# Patient Record
Sex: FEMALE | Race: WHITE
[De-identification: names, ages, dates, MRNs, and addresses within clinical notes are randomized per-mention and may not be internally consistent; named-entity substitution may affect disease eponyms.]

---

## 2018-06-08 ENCOUNTER — HOSPITAL ENCOUNTER (OUTPATIENT)
Dept: HOSPITAL 46 - DS | Age: 53
Discharge: HOME | End: 2018-06-08
Attending: SURGERY
Payer: COMMERCIAL

## 2018-06-08 VITALS — BODY MASS INDEX: 35.36 KG/M2 | WEIGHT: 220 LBS | HEIGHT: 66 IN

## 2018-06-08 DIAGNOSIS — K42.0: Primary | ICD-10-CM

## 2018-06-08 DIAGNOSIS — Z88.8: ICD-10-CM

## 2018-06-08 DIAGNOSIS — Z79.899: ICD-10-CM

## 2018-06-08 DIAGNOSIS — E89.0: ICD-10-CM

## 2018-06-08 DIAGNOSIS — Z88.5: ICD-10-CM

## 2018-06-08 PROCEDURE — 0WQF0ZZ REPAIR ABDOMINAL WALL, OPEN APPROACH: ICD-10-PCS | Performed by: SURGERY

## 2018-06-08 NOTE — NUR
06/08/18 Alexis8 Justine Perez
0946 PT ARRIVES TO PACU, RESTING BUT AROUSABLE TO VOICE. LR INFUSING
TO RIGHT WRIST. PT DENIES PAIN. ABD SOFT, NON DISTENDED. PLACE ON ALL
MONITORS.
0951 PT PLACED TO ROOM AIR DUE TO NAUSEA, MAINTAINING AT THIS TIME.
0958 PT MEDICATED WITH PHENERGAN FOR INCREASING NASUEA, ALCOHOL SWAB
USED TO DECREASE NAUSEA.
1002 PT REPORTS NAUSEA IMPROVING, DENIES PAIN. BREATHING EVEN AND NON
LABORED, PT COUGHING WITH DEEP BREATHIGN. PT PLACED ON 2L PER NC FOR
SATS DOWN TO 88% WHILE RESTING. SATS UP 96% WITH O2.

## 2018-06-08 NOTE — OR
[facility]
                                    [address], [place]  [address]
_________________________________________________________________________________________
                                                                 Signed   
 
 
DATE OF OPERATION:
06/08/2018
 
SURGEON:
Francois Avery MD
 
PREOPERATIVE DIAGNOSIS:
Incarcerated epigastric hernia.
 
POSTOPERATIVE DIAGNOSIS:
Incarcerated umbilical hernia (4 mm).
 
PROCEDURE:
Primary umbilical herniorrhaphy.
 
ESTIMATED BLOOD LOSS:
None.
 
INDICATIONS:
Yazmin is a 52-year-old female who came to see me for what we thought was going to be an
epigastric hernia.  She had a previous panniculectomy where they removed her redundant
pannus and then pulled the skin down to her waistline to bring it together and of course
they have to transpose the umbilicus further up the skin.  She noticed a lump on the
superior side of that umbilicus for about 3 years.  She said the pain is usually about a
3/10 on a pain scale.  She said it is sometime larger than others, but she did not feel
like she could ever push it back inside.  She came to me as a general surgeon.  In the
office, we could feel this area just on the superior side of her umbilicus.  Yazmin
explained to me that there was no plication of her midline, which makes this a much
easier surgery.  I explained to Yazmin that she could very well have an epigastric
hernia or umbilical hernia or both.  We also discussed the blood supply to her umbilicus
and we decided we would use a transverse incision that would include the superior scar
of her previous umbilicus and hopefully we will preserve the blood flow to that
umbilical skin.  There is some risk that she could lose that skin.  I did give her a
[employer] brochure on hernias and we looked at both umbilical and epigastric hernias.  We
repaired them in the same manner.  Usually, we use mesh, but if the hole is very small,
we will often just use a few stitches.  She understands the expected intraop and postop
course.  There is risk to that surgery including, but not limited to bleeding,
infection, scarring, change in contour of the skin as well as possible ischemic loss of
that umbilical skin.  She had expressed understanding and wished to proceed. 
 
PROCEDURE NOTE:
I met with Yazmin and her grandmother in our preop area.  Yazmin was able to palpate
 
    Electronically Signed By: FRANCOIS AVERY MD  06/08/18 1136
_________________________________________________________________________________________
PATIENT NAME:     YAZMIN WAYNE                   
MEDICAL RECORD #: [id number]            OPERATIVE REPORT              
          ACCT #: [id number]  
DATE OF BIRTH:   [birth year]            REPORT #: [id number]      
PHYSICIAN:        FRANCOIS AVERY MD             
PCP:              NO PRIMARY CARE PHYSICIAN     
REPORT IS CONFIDENTIAL AND NOT TO BE RELEASED WITHOUT AUTHORIZATION
 
 
                                  [facility]
                                    [address], [place]  [address]
_________________________________________________________________________________________
                                                                 Signed   
 
 
that area once again and we therefore marked it with the pin.  After this, she was taken
to the operating room and placed in the supine position under general anesthesia.  She
was given preoperative medication because of her history of nausea with anesthesia.  We
also gave her preoperative antibiotics along with subcutaneous heparin.  In addition,
SCDs were utilized.  After this, she was prepped and draped in the usual sterile
fashion.  We then developed a transverse incision to include the superior scar of her
previous umbilical surgery.  We carried this down through the tissues bluntly and with
the cautery.  The surrounding fat was quite soft and indurated.  We examined the area of
the midline just above the umbilicus and it remained intact.  However, we could feel a
hard lump of indurated fat at the 12 to 1 o'clock position of her umbilicus.  This is
the area that Yazmin was pointing to in the office and in our preop area.  I could feel
at the base of the umbilicus, there was a small defect.  Consequently, I went ahead and
divided that with the cautery and sure enough there was a small hernia sac there with
preperitoneal fat coming through a 4 mm fascial defect.  I suspect that explains some of
her pain.  We went ahead and reduced that fat and we simply closed that small fascial
defect transversely with a running #1 Prolene suture.  That brought the fascia back
together nicely and it is flat and will give her excellent cosmetic result.  We left
some of the indurated fat on the side of that umbilicus because of fear of interrupting
the blood supply in the more than necessary.  We used a 2-0 PDS suture to bring that
umbilical skin back down to the midline.  Local anesthetic was injected in the wound and
the wound was irrigated and suctioned out until clear.  The dermis was reapproximated
with interrupted 3-0 Monocryl sutures.  The skin edges were reapproximated with a
running 6-0 fast absorbing plain gut suture.  Dry gauze and tape were then applied.
Yazmin was awakened from anesthesia, extubated in the OR, and taken to recovery room in
stable condition. 
 
 
 
            ________________________________________
            Francois Avery MD 
 
 
ALB/MODL
Job #:  [id number]
DD:  06/08/2018 09:57:42
DT:  06/08/2018 11:15:15
 
cc:            MICHAEL Metcalf MD Patricia J Winn, MD
 
    Electronically Signed By: FRANCOIS AVERY MD  06/08/18 1136
_________________________________________________________________________________________
PATIENT NAME:     YAZMIN WAYNE                   
MEDICAL RECORD #: [id number]            OPERATIVE REPORT              
          ACCT #: [id number]  
DATE OF BIRTH:   [birth year]            REPORT #: [id number]      
PHYSICIAN:        FRANCOIS AVERY MD             
PCP:              NO PRIMARY CARE PHYSICIAN     
REPORT IS CONFIDENTIAL AND NOT TO BE RELEASED WITHOUT AUTHORIZATION
 
 
                                  [facility]
                                    [address], [place]  [address]
_________________________________________________________________________________________
                                                                 Signed   
 
 
Copies:  MARIAH FIELDS ANDREW L MD WINN, PATRICIA J MD
~
 
 
 
 
 
 
 
 
 
 
 
 
 
 
 
 
 
 
 
 
 
 
 
 
 
 
 
 
 
 
 
 
 
 
 
 
 
 
 
    Electronically Signed By: FRANCOIS AVERY MD  06/08/18 1136
_________________________________________________________________________________________
PATIENT NAME:     YAZMIN WAYNE                   
MEDICAL RECORD #: [id number]            OPERATIVE REPORT              
          ACCT #: [id number]  
DATE OF BIRTH:   [birth year]            REPORT #: [id number]      
PHYSICIAN:        FRANCOIS AVERY MD             
PCP:              NO PRIMARY CARE PHYSICIAN     
REPORT IS CONFIDENTIAL AND NOT TO BE RELEASED WITHOUT AUTHORIZATION